# Patient Record
Sex: FEMALE | Race: WHITE | NOT HISPANIC OR LATINO | Employment: FULL TIME | URBAN - METROPOLITAN AREA
[De-identification: names, ages, dates, MRNs, and addresses within clinical notes are randomized per-mention and may not be internally consistent; named-entity substitution may affect disease eponyms.]

---

## 2017-05-12 ENCOUNTER — HOSPITAL ENCOUNTER (OUTPATIENT)
Dept: MAMMOGRAPHY | Facility: CLINIC | Age: 52
Discharge: HOME | End: 2017-05-12
Attending: PHYSICIAN ASSISTANT | Admitting: PHYSICIAN ASSISTANT
Payer: COMMERCIAL

## 2017-05-12 DIAGNOSIS — Z12.31 VISIT FOR SCREENING MAMMOGRAM: ICD-10-CM

## 2017-05-12 PROCEDURE — G0202 SCR MAMMO BI INCL CAD: HCPCS

## 2019-11-12 ENCOUNTER — HOSPITAL ENCOUNTER (OUTPATIENT)
Dept: MAMMOGRAPHY | Facility: CLINIC | Age: 54
Discharge: HOME | End: 2019-11-12
Attending: PHYSICIAN ASSISTANT | Admitting: PHYSICIAN ASSISTANT
Payer: COMMERCIAL

## 2019-11-12 DIAGNOSIS — Z12.31 VISIT FOR SCREENING MAMMOGRAM: ICD-10-CM

## 2019-11-12 PROCEDURE — 77063 BREAST TOMOSYNTHESIS BI: CPT

## 2020-11-18 ENCOUNTER — HOSPITAL ENCOUNTER (OUTPATIENT)
Dept: MAMMOGRAPHY | Facility: CLINIC | Age: 55
Discharge: HOME | End: 2020-11-18
Attending: PHYSICIAN ASSISTANT | Admitting: PHYSICIAN ASSISTANT
Payer: COMMERCIAL

## 2020-11-18 DIAGNOSIS — Z12.31 VISIT FOR SCREENING MAMMOGRAM: ICD-10-CM

## 2020-11-18 PROCEDURE — 77063 BREAST TOMOSYNTHESIS BI: CPT

## 2021-04-10 ENCOUNTER — HEALTH MAINTENANCE LETTER (OUTPATIENT)
Age: 56
End: 2021-04-10

## 2021-09-19 ENCOUNTER — HEALTH MAINTENANCE LETTER (OUTPATIENT)
Age: 56
End: 2021-09-19

## 2021-12-29 ENCOUNTER — HOSPITAL ENCOUNTER (OUTPATIENT)
Dept: MAMMOGRAPHY | Facility: CLINIC | Age: 56
Discharge: HOME | End: 2021-12-29
Attending: PHYSICIAN ASSISTANT | Admitting: PHYSICIAN ASSISTANT
Payer: COMMERCIAL

## 2021-12-29 DIAGNOSIS — Z12.31 BREAST CANCER SCREENING BY MAMMOGRAM: ICD-10-CM

## 2021-12-29 PROCEDURE — 77063 BREAST TOMOSYNTHESIS BI: CPT

## 2022-05-01 ENCOUNTER — HEALTH MAINTENANCE LETTER (OUTPATIENT)
Age: 57
End: 2022-05-01

## 2022-11-21 ENCOUNTER — HEALTH MAINTENANCE LETTER (OUTPATIENT)
Age: 57
End: 2022-11-21

## 2022-12-05 NOTE — PROGRESS NOTES
"Assessment:      ICD-10-CM    1. Plantar fasciitis, right  M72.2 XR Foot Right G/E 3 Views     diclofenac (VOLTAREN) 50 MG EC tablet      2. Hallux valgus of right foot  M20.11       3. Pes valgus  Q66.6            Plan:  Orders Placed This Encounter   Procedures     XR Foot Right G/E 3 Views         Discussed the etiology and treatment of the condition with the patient.  Imaging studies reviewed and discussed with the patient.  Discussed surgical and conservative options.    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      Return:  No follow-ups on file.    Ifeoma Bal DPM                Chief Complaint:     Patient presents with:  Foot Problems     right heel pain    HPI:  Susan Dooley is a 57 year old year old female who presents for evaluation of heel pain.    Pain location bottom of heel.  Occastional top of midfoot    Months  No injury  Birkenstocks help    Past Medical & Surgical History:  No past medical history on file.   No past surgical history on file.   No family history on file.     Social History:  ?  History   Smoking Status     Never   Smokeless Tobacco     Never     History   Drug Use Not on file     Social History    Substance and Sexual Activity      Alcohol use: Not on file      Allergies:  ?   Allergies   Allergen Reactions     Nkda [No Known Drug Allergies]         Medications:    Current Outpatient Medications   Medication     citalopram (CELEXA) 20 MG tablet     diclofenac (VOLTAREN) 50 MG EC tablet     fluticasone (FLONASE) 50 MCG/ACT nasal spray     FLUoxetine (PROZAC) 10 MG capsule     nitroFURantoin macrocrystal-monohydrate (MACROBID) 100 MG capsule     No current facility-administered medications for this visit.       Physical Exam:  ?  Vitals:  /72   Ht 1.676 m (5' 6\")   Wt 95.3 kg (210 lb)   BMI 33.89 kg/m  "    General:  WD/WN, in NAD.  A&O x3.  Dermatologic:    Skin is intact, open lesions absent.   Skin texture, turgor is normal.  Vascular:  Pulses palpable right.  Digital capillary refill time normal right.  Skin temperature is normal to affected heel.  Generalized edema- none bilateral.  Focal edema- mild heel right.  Neurologic:    Gross sensation normal.  Gait and balance abnormal, antalgic,.  Musculoskeletal:  Maximal pain to palpation of plantarmedial heel right.  mild pain to palpation of 1st > 2nd TMTJ right.  Ankle dorsiflexion <10 degrees with the knee extended, <10 degrees with the knee flexed.  STJ, MTJ ROM intact to affected extremity.  1st TMTJ ROM increased, mild crepitation, R.  Muscle strength 5/5  foot and ankle to affected extremity.     Stance:  Foot type:  Flexible valgus  Clinical deformity: hallux valgus    Imaging:   x-ray independently reviewed and interpreted by myself today.  Weight-bearing views right foot dated 12/06/22, reveal mild to moderate flatfoot w/ TMTJ arch sag, moderate bunion.  No planatar or posterior heel spur

## 2022-12-06 ENCOUNTER — ANCILLARY PROCEDURE (OUTPATIENT)
Dept: GENERAL RADIOLOGY | Facility: CLINIC | Age: 57
End: 2022-12-06
Attending: PODIATRIST
Payer: COMMERCIAL

## 2022-12-06 ENCOUNTER — OFFICE VISIT (OUTPATIENT)
Dept: PODIATRY | Facility: CLINIC | Age: 57
End: 2022-12-06
Payer: COMMERCIAL

## 2022-12-06 VITALS
WEIGHT: 210 LBS | SYSTOLIC BLOOD PRESSURE: 122 MMHG | DIASTOLIC BLOOD PRESSURE: 72 MMHG | BODY MASS INDEX: 33.75 KG/M2 | HEIGHT: 66 IN

## 2022-12-06 DIAGNOSIS — M20.11 HALLUX VALGUS OF RIGHT FOOT: ICD-10-CM

## 2022-12-06 DIAGNOSIS — M72.2 PLANTAR FASCIITIS, RIGHT: Primary | ICD-10-CM

## 2022-12-06 DIAGNOSIS — Q66.6 PES VALGUS: ICD-10-CM

## 2022-12-06 DIAGNOSIS — M79.671 RIGHT FOOT PAIN: ICD-10-CM

## 2022-12-06 PROCEDURE — 99204 OFFICE O/P NEW MOD 45 MIN: CPT | Performed by: PODIATRIST

## 2022-12-06 PROCEDURE — 73630 X-RAY EXAM OF FOOT: CPT | Mod: TC | Performed by: RADIOLOGY

## 2022-12-06 RX ORDER — NITROFURANTOIN 25; 75 MG/1; MG/1
CAPSULE ORAL
COMMUNITY
Start: 2022-12-05

## 2022-12-06 RX ORDER — FLUOXETINE 10 MG/1
10 CAPSULE ORAL DAILY
COMMUNITY
Start: 2022-11-20

## 2022-12-06 NOTE — PATIENT INSTRUCTIONS
PATIENT INSTRUCTIONS - Podiatry / Foot & Ankle Surgery    Diclofenac / Voltaren Gel- Apply to affected area only.  Apply 3-4 times daily for the first 3-4 days, then 1-2 times daily as needed.  Over the counter (OTC), a prescription is not needed.  Available at most pharmacies, [facility], [employer].      Diclofenac / Voltaren - 1 pill twice daily x1 week.  Then take a 1 week break.  Repeat as needed.  Take with food & an acid blocker if stomach upset occurs.  Stop all other NSAIDs (aspirin, ibuprofen/Motrin, naproxen/ Aleve).      Calf muscle stretching 2-3x daily as instructed, both with the knees straight and the knees bent. Hold for 30-60 seconds.  For personal instruction on this,  please request a Physical Therapy referral from your doctor.      SuperFeet orthotics  SuperFeet are over the counter (OTC), you do not need a prescription.  Wear Superfeet orthotics in all of your shoes.  Remove the existing liner and replace it with SuperFeet.    SuperFeet are available on [employer], at [employer] and most [employer].      If not improved - cortisone injection, PT referral, walking boot

## 2022-12-06 NOTE — LETTER
12/6/2022         RE: Susan Dooley  [address] [place] [facility] [address]        Dear Colleague,    Thank you for referring your patient, Susan Dooley, to the [facility]. Please see a copy of my visit note below.    Assessment:      ICD-10-CM    1. Plantar fasciitis, right  M72.2 XR Foot Right G/E 3 Views     diclofenac (VOLTAREN) 50 MG EC tablet      2. Hallux valgus of right foot  M20.11       3. Pes valgus  Q66.6            Plan:  Orders Placed This Encounter   Procedures     XR Foot Right G/E 3 Views         Discussed the etiology and treatment of the condition with the patient.  Imaging studies reviewed and discussed with the patient.  Discussed surgical and conservative options.    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      Return:  No follow-ups on file.    Ifeoma Bal DPM                Chief Complaint:     Patient presents with:  Foot Problems     right heel pain    HPI:  Susan Dooley is a 57 year old year old female who presents for evaluation of heel pain.    Pain location bottom of heel.  Occastional top of midfoot    Months  No injury  Birkenstocks help    Past Medical & Surgical History:  No past medical history on file.   No past surgical history on file.   No family history on file.     Social History:  ?  History   Smoking Status     Never   Smokeless Tobacco     Never     History   Drug Use Not on file     Social History    Substance and Sexual Activity      Alcohol use: Not on file      Allergies:  ?   Allergies   Allergen Reactions     Nkda [No Known Drug Allergies]         Medications:    Current Outpatient Medications   Medication     citalopram (CELEXA) 20 MG tablet     diclofenac (VOLTAREN) 50 MG EC tablet     fluticasone (FLONASE) 50 MCG/ACT nasal spray     FLUoxetine (PROZAC)  "10 MG capsule     nitroFURantoin macrocrystal-monohydrate (MACROBID) 100 MG capsule     No current facility-administered medications for this visit.       Physical Exam:  ?  Vitals:  /72   Ht 1.676 m (5' 6\")   Wt 95.3 kg (210 lb)   BMI 33.89 kg/m     General:  WD/WN, in NAD.  A&O x3.  Dermatologic:    Skin is intact, open lesions absent.   Skin texture, turgor is normal.  Vascular:  Pulses palpable right.  Digital capillary refill time normal right.  Skin temperature is normal to affected heel.  Generalized edema- none bilateral.  Focal edema- mild heel right.  Neurologic:    Gross sensation normal.  Gait and balance abnormal, antalgic,.  Musculoskeletal:  Maximal pain to palpation of plantarmedial heel right.  mild pain to palpation of 1st > 2nd TMTJ right.  Ankle dorsiflexion <10 degrees with the knee extended, <10 degrees with the knee flexed.  STJ, MTJ ROM intact to affected extremity.  1st TMTJ ROM increased, mild crepitation, R.  Muscle strength 5/5  foot and ankle to affected extremity.     Stance:  Foot type:  Flexible valgus  Clinical deformity: hallux valgus    Imaging:   x-ray independently reviewed and interpreted by myself today.  Weight-bearing views right foot dated 12/06/22, reveal mild to moderate flatfoot w/ TMTJ arch sag, moderate bunion.  No planatar or posterior heel spur            Again, thank you for allowing me to participate in the care of your patient.        Sincerely,        Ifeoma Bal DPM    "

## 2022-12-30 ENCOUNTER — HOSPITAL ENCOUNTER (OUTPATIENT)
Dept: MAMMOGRAPHY | Facility: CLINIC | Age: 57
Discharge: HOME | End: 2022-12-30
Attending: PHYSICIAN ASSISTANT | Admitting: PHYSICIAN ASSISTANT
Payer: COMMERCIAL

## 2022-12-30 DIAGNOSIS — Z12.31 VISIT FOR SCREENING MAMMOGRAM: ICD-10-CM

## 2022-12-30 PROCEDURE — 77067 SCR MAMMO BI INCL CAD: CPT

## 2023-01-01 DIAGNOSIS — M72.2 PLANTAR FASCIITIS, RIGHT: ICD-10-CM

## 2023-01-02 NOTE — TELEPHONE ENCOUNTER
Refill request received for Diclofenac 50mg from [facility].     Last office visit 12/6/22  Last refill: 12/6/22  Sig: take 1 tab twice daily x 7 days, #28, 1 refill.   Too soon to refill as refill is available.   Refill denied.     OSBALDO Locke, RN

## 2023-06-02 ENCOUNTER — HEALTH MAINTENANCE LETTER (OUTPATIENT)
Age: 58
End: 2023-06-02

## 2024-03-06 ENCOUNTER — HOSPITAL ENCOUNTER (OUTPATIENT)
Dept: MAMMOGRAPHY | Facility: CLINIC | Age: 59
Discharge: HOME | End: 2024-03-06
Attending: PHYSICIAN ASSISTANT | Admitting: PHYSICIAN ASSISTANT
Payer: COMMERCIAL

## 2024-03-06 DIAGNOSIS — Z12.31 VISIT FOR SCREENING MAMMOGRAM: ICD-10-CM

## 2024-03-06 PROCEDURE — 77063 BREAST TOMOSYNTHESIS BI: CPT

## 2024-06-22 ENCOUNTER — HEALTH MAINTENANCE LETTER (OUTPATIENT)
Age: 59
End: 2024-06-22

## 2025-03-12 ENCOUNTER — HOSPITAL ENCOUNTER (OUTPATIENT)
Dept: MAMMOGRAPHY | Facility: CLINIC | Age: 60
Discharge: HOME | End: 2025-03-12
Attending: PHYSICIAN ASSISTANT
Payer: COMMERCIAL

## 2025-03-12 DIAGNOSIS — Z12.31 VISIT FOR SCREENING MAMMOGRAM: ICD-10-CM

## 2025-03-12 PROCEDURE — 77063 BREAST TOMOSYNTHESIS BI: CPT

## 2025-07-12 ENCOUNTER — HEALTH MAINTENANCE LETTER (OUTPATIENT)
Age: 60
End: 2025-07-12